# Patient Record
Sex: FEMALE | Race: WHITE | NOT HISPANIC OR LATINO | Employment: FULL TIME | URBAN - METROPOLITAN AREA
[De-identification: names, ages, dates, MRNs, and addresses within clinical notes are randomized per-mention and may not be internally consistent; named-entity substitution may affect disease eponyms.]

---

## 2019-10-11 ENCOUNTER — DOCUMENTATION ONLY (OUTPATIENT)
Dept: CONSULT | Facility: CLINIC | Age: 64
End: 2019-10-11

## 2019-10-11 DIAGNOSIS — Z84.89 FAMILY HISTORY OF GENETIC DISEASE: ICD-10-CM

## 2019-10-22 NOTE — PROGRESS NOTES
We saw Elina in the [facility] on 10/7/2019 for her daughter, Lenore's visit. 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 We consented Elina for testing for the translocation at that visit as we expect that she may be carry the translocation in the balanced form. Because her daughter's testing was approved by insurance, we will await the results of Lenore's testing to determine if we should continue with testing Elian. If Lenore is found to have the unbalanced translocation, Elina, rather than her , is likely a carrier based on her family history. Further testing of Elina is not indicated in this case as she lacks the medical features of an unbalanced translocation and is not planning on having more children. If there is a different genetic etiology identified for Lenore, we may revisit Elina's testing.    I spoke to Elina about the above plan, and she is in agreement. She denied follow-up questions and contact information was provided.    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 To confirm the inheritance of the translocation (maternal or paternal), parental testing was offered. Isi's father has passed, so we will test Isi's mother first. If positive, we will have confirmed the side of the family that carries the translocation, which will allow us to provide targeted testing for other family members who are considering have children.     We will draw blood for [facility] sendout FISH testing for the 2;10 translocation. This will be billed through insurance by [facility]. A copy of Isi's report will be given to [facility] for targeted testing.    The potential results were discussed including a positive or negative.      A positive result would confirm maternal inheritance and allow for targeted testing of maternal family members.    A negative result would support that the change is  paternally inherited. We may test a paternal family member at that time.    Insurance prior authorization and billing procedures were covered with the family. The family was encouraged to check with their insurance company for coverage prior to testing. Our prior authorization process takes 2 to 4 weeks, at which time the family will be contacted with the determination. They can choose to cancel the test if they wish, otherwise,testing will commence. Test results are expected 3 to 4 weeks after this. The family was aware that they may still be charged for a blood draw.    The family has provided written informed consent for the testing. We will plan to follow-up with the family by phone when results are returned. Additional questions or concerns were denied.       Plan:  1. Blood will be drawn at Isi's follow-up appointment on 12/4 and sent to FISH testing for the 2;10 translocation at [facility]    2. PA will be sent by [employer], and they will call Elina with expected coverage information.    3. Results will be returned by phone and we will schedule a follow-up appointment at that time if needed.    4. Contact information was provided should any questions arise in the future.    Kym Whipple [facility]  Genetic Counselor  [facility]   Phone: [phone]  Pager: [phone]  Email: [email or web address]

## 2019-10-28 ENCOUNTER — TELEPHONE (OUTPATIENT)
Dept: CONSULT | Facility: CLINIC | Age: 64
End: 2019-10-28

## 2019-10-28 NOTE — TELEPHONE ENCOUNTER
Spoke with Elina about papers to sign and she let mw know she would go fax it right away.     Viry Rubio  Department    Department of Genetics  P:[phone]

## 2019-11-22 DIAGNOSIS — Z84.89 FAMILY HISTORY OF GENETIC DISEASE: Primary | ICD-10-CM

## 2019-11-27 DIAGNOSIS — Z84.89 FAMILY HISTORY OF GENETIC DISEASE: Primary | ICD-10-CM

## 2019-12-04 ENCOUNTER — TRANSFERRED RECORDS (OUTPATIENT)
Dept: HEALTH INFORMATION MANAGEMENT | Facility: CLINIC | Age: 64
End: 2019-12-04

## 2019-12-04 DIAGNOSIS — Z84.89 FAMILY HISTORY OF GENETIC DISEASE: ICD-10-CM

## 2019-12-04 LAB — MISCELLANEOUS TEST: NORMAL

## 2019-12-04 PROCEDURE — 36415 COLL VENOUS BLD VENIPUNCTURE: CPT | Performed by: MEDICAL GENETICS

## 2019-12-11 ENCOUNTER — TELEPHONE (OUTPATIENT)
Dept: CONSULT | Facility: CLINIC | Age: 64
End: 2019-12-11

## 2019-12-11 NOTE — TELEPHONE ENCOUNTER
Called Elina because [facility] OOP cost. Our portal lists an OOP estimate of $732. Elina states her OOP cost with financial assistance is $37. Testing is proceeding, and I will call her with the results when they return in late December/early January.    Kym Whipple [facility]  Genetic Counselor   [facility]   Phone: [phone]  Pager: [phone]  Email: [email or web address]

## 2019-12-26 LAB — LAB SCANNED RESULT: NORMAL

## 2020-01-17 ENCOUNTER — TELEPHONE (OUTPATIENT)
Dept: CONSULT | Facility: CLINIC | Age: 65
End: 2020-01-17

## 2020-01-17 NOTE — TELEPHONE ENCOUNTER
Called Elina to disclose results from her genetic testing for the familial translocation that her daughter has. Left non-detailed voicemail. She called back, and the information below was discussed:    Targeted testing using FISH for the 2q and 10p chromosomal regions was negative for a balanced translocation. Lorrie does not carry a balanced translocation per this result.     jonathan 2q37.3([id number]),10p15.3([id number])    There are three possible reasons for this result:  1. True negative (the balanced carrier was Lenore's father. We are unable to confirm this.)  2. Gonadal mosaicism (unable to be ruled out)  3. False negative (technical error in the test. This can be investigated further)    1. True negative, paternal inheritance  Elina states that her  had a few features that made her wonder if he had a genetic change. These features include asthma, some awkwardness, and mild dysmorphic facial features. He was a very brilliant man. We discussed that this may be related to a genetic alteration, but could also be perfectly normal. His features do not sound to be related to the unbalanced translocation, and balanced carriers do not usually have clinical findings related to the translocation. That said, carrying any genetic change can't be ruled out for anyone. Elina does not have a lot of health information for his side of the family, and is unaware of recurrent miscarriages/still birth/multiple congenital anomalies.     If this was a paternally inherited translocation, others in the paternal family may carry it. Elina states that she will inform them of this result as it could impact their reproductive decisions. These individuals can be tested for the same balanced translocation if they are thinking of having children or have a child who may be affected.    2. Gonadal mosaicism  Sometimes individuals carry a genetic change in their egg or sperm cells alone. These are generally new changes  that were not inherited from a parent, but may be passed on during pregnancy to multiple children. Either Elina or her  may be mosaic for the translocation. Because we cannot test the eggs or sperm directly, this possibility cannot be ruled out.    3. False Negative  Because a positive control sample was not included originally, it is possible that this result is a false negative (Elina carries the change, but the test didn't work appropriately). To determine if this was a true or false negative, we can send Mercedezs blood to [facility] as a positive control that would not be charged (per [facility]). There may be a charge for the blood draw. Elina is interested in this, and I will place the orders. She will schedule a lab draw with the [facility] .     A copy of the results will be mailed to Elina. No further questions or concerns. I will call her with the results of the positive control testing in ~4 weeks after blood is drawn.      Kym Whipple [facility]  Genetic Counselor   [facility]   Phone: [phone]  Pager: [phone]  Email: [email or web address]

## 2020-03-19 ENCOUNTER — TELEPHONE (OUTPATIENT)
Dept: CONSULT | Facility: CLINIC | Age: 65
End: 2020-03-19

## 2020-03-19 NOTE — TELEPHONE ENCOUNTER
Called Elina to disclose results from her genetic testing for the familial translocation that her daughter has. Left non-detailed voicemail. She called back, and the information below was discussed:     Targeted testing using FISH for the 2q and 10p chromosomal regions was negative for a balanced translocation. Analysis was confirmed by sending a positive control sample. Lorrie does not carry the balanced translocation in her blood.     jonathan 2q37.3([id number]),10p15.3([id number])      There are three possible reasons for this result:  1. Paternal inheritance  2. Gonadal mosaicism      1. Paternal inheritance  Elina states that her  had a few features that made her wonder if he had a genetic change. These features include asthma, some awkwardness, and mild dysmorphic facial features. He was a very brilliant man. We discussed that this may be related to a genetic alteration, but could also be perfectly normal. His features do not sound to be related to the unbalanced translocation, and balanced carriers do not usually have clinical findings related to the translocation. That said, carrying any genetic change can't be ruled out for anyone. Elina does not have a lot of health information for his side of the family, and is unaware of recurrent miscarriages/still birth/multiple congenital anomalies.      If this was a paternally inherited translocation, others in the paternal family may carry it. Elina states that she will inform them of this result as it could impact their reproductive decisions. These individuals can be tested for the same balanced translocation if they are thinking of having children or have a child who may be affected.     2. Gonadal mosaicism  Sometimes individuals carry a genetic change in their egg or sperm cells alone. These are generally new changes that were not inherited from a parent, but may be passed on during pregnancy to multiple children. Either Elina or her   may be mosaic for the translocation. Because we cannot test the eggs or sperm directly, this possibility cannot be ruled out.     A copy of the results and a letter with their interpretation will be mailed to Eilna. No further questions or concerns.      Family letter to be sent.      Kym Whipple [facility]  Genetic Counselor   [facility]   Phone: [phone]  Pager: [phone]  Email: [email or web address]